# Patient Record
Sex: FEMALE | Race: WHITE | URBAN - METROPOLITAN AREA
[De-identification: names, ages, dates, MRNs, and addresses within clinical notes are randomized per-mention and may not be internally consistent; named-entity substitution may affect disease eponyms.]

---

## 2023-06-26 ENCOUNTER — HOSPITAL ENCOUNTER (EMERGENCY)
Age: 38
Discharge: HOME | End: 2023-06-27
Attending: STUDENT IN AN ORGANIZED HEALTH CARE EDUCATION/TRAINING PROGRAM

## 2023-06-26 DIAGNOSIS — F10.121 ALCOHOL INTOXICATION DELIRIUM (HCC): Primary | ICD-10-CM

## 2023-06-26 DIAGNOSIS — F39 MOOD DISORDER (HCC): ICD-10-CM

## 2023-06-26 LAB
ALBUMIN SERPL-MCNC: 4.4 G/DL (ref 3.4–5)
ALBUMIN/GLOB SERPL: 1.7 {RATIO} (ref 1.1–2.2)
ALP SERPL-CCNC: 38 U/L (ref 40–129)
ALT SERPL-CCNC: 12 U/L (ref 10–40)
AMPHETAMINES UR QL SCN>1000 NG/ML: NORMAL
ANION GAP SERPL CALCULATED.3IONS-SCNC: 20 MMOL/L (ref 3–16)
APAP SERPL-MCNC: <5 UG/ML (ref 10–30)
AST SERPL-CCNC: 22 U/L (ref 15–37)
BARBITURATES UR QL SCN>200 NG/ML: NORMAL
BASOPHILS # BLD: 0.1 K/UL (ref 0–0.2)
BASOPHILS NFR BLD: 0.8 %
BENZODIAZ UR QL SCN>200 NG/ML: NORMAL
BILIRUB SERPL-MCNC: 0.3 MG/DL (ref 0–1)
BUN SERPL-MCNC: 7 MG/DL (ref 7–20)
CALCIUM SERPL-MCNC: 9.3 MG/DL (ref 8.3–10.6)
CANNABINOIDS UR QL SCN>50 NG/ML: NORMAL
CHLORIDE SERPL-SCNC: 104 MMOL/L (ref 99–110)
CO2 SERPL-SCNC: 19 MMOL/L (ref 21–32)
COCAINE UR QL SCN: NORMAL
CREAT SERPL-MCNC: 0.7 MG/DL (ref 0.6–1.1)
DEPRECATED RDW RBC AUTO: 14.4 % (ref 12.4–15.4)
DRUG SCREEN COMMENT UR-IMP: NORMAL
EOSINOPHIL # BLD: 0.1 K/UL (ref 0–0.6)
EOSINOPHIL NFR BLD: 1.3 %
ETHANOLAMINE SERPL-MCNC: 270 MG/DL (ref 0–0.08)
FENTANYL SCREEN, URINE: NORMAL
GFR SERPLBLD CREATININE-BSD FMLA CKD-EPI: >60 ML/MIN/{1.73_M2}
GLUCOSE SERPL-MCNC: 95 MG/DL (ref 70–99)
HCT VFR BLD AUTO: 42.9 % (ref 36–48)
HGB BLD-MCNC: 14.4 G/DL (ref 12–16)
LYMPHOCYTES # BLD: 2.4 K/UL (ref 1–5.1)
LYMPHOCYTES NFR BLD: 25.5 %
MCH RBC QN AUTO: 31.9 PG (ref 26–34)
MCHC RBC AUTO-ENTMCNC: 33.5 G/DL (ref 31–36)
MCV RBC AUTO: 95.1 FL (ref 80–100)
METHADONE UR QL SCN>300 NG/ML: NORMAL
MONOCYTES # BLD: 0.7 K/UL (ref 0–1.3)
MONOCYTES NFR BLD: 7.5 %
NEUTROPHILS # BLD: 6 K/UL (ref 1.7–7.7)
NEUTROPHILS NFR BLD: 64.9 %
OPIATES UR QL SCN>300 NG/ML: NORMAL
OXYCODONE UR QL SCN: NORMAL
PCP UR QL SCN>25 NG/ML: NORMAL
PH UR STRIP: 5 [PH]
PLATELET # BLD AUTO: 330 K/UL (ref 135–450)
PMV BLD AUTO: 7.6 FL (ref 5–10.5)
POTASSIUM SERPL-SCNC: 3.6 MMOL/L (ref 3.5–5.1)
PROT SERPL-MCNC: 7 G/DL (ref 6.4–8.2)
RBC # BLD AUTO: 4.52 M/UL (ref 4–5.2)
SALICYLATES SERPL-MCNC: <0.3 MG/DL (ref 15–30)
SODIUM SERPL-SCNC: 143 MMOL/L (ref 136–145)
WBC # BLD AUTO: 9.3 K/UL (ref 4–11)

## 2023-06-26 PROCEDURE — 80143 DRUG ASSAY ACETAMINOPHEN: CPT

## 2023-06-26 PROCEDURE — 82077 ASSAY SPEC XCP UR&BREATH IA: CPT

## 2023-06-26 PROCEDURE — 80053 COMPREHEN METABOLIC PANEL: CPT

## 2023-06-26 PROCEDURE — 80307 DRUG TEST PRSMV CHEM ANLYZR: CPT

## 2023-06-26 PROCEDURE — 80179 DRUG ASSAY SALICYLATE: CPT

## 2023-06-26 PROCEDURE — 6360000002 HC RX W HCPCS: Performed by: STUDENT IN AN ORGANIZED HEALTH CARE EDUCATION/TRAINING PROGRAM

## 2023-06-26 PROCEDURE — 85025 COMPLETE CBC W/AUTO DIFF WBC: CPT

## 2023-06-26 PROCEDURE — 2580000003 HC RX 258: Performed by: STUDENT IN AN ORGANIZED HEALTH CARE EDUCATION/TRAINING PROGRAM

## 2023-06-26 PROCEDURE — 99285 EMERGENCY DEPT VISIT HI MDM: CPT

## 2023-06-26 PROCEDURE — 96372 THER/PROPH/DIAG INJ SC/IM: CPT

## 2023-06-26 PROCEDURE — 36415 COLL VENOUS BLD VENIPUNCTURE: CPT

## 2023-06-26 RX ORDER — 0.9 % SODIUM CHLORIDE 0.9 %
1000 INTRAVENOUS SOLUTION INTRAVENOUS ONCE
Status: COMPLETED | OUTPATIENT
Start: 2023-06-26 | End: 2023-06-27

## 2023-06-26 RX ORDER — DROPERIDOL 2.5 MG/ML
5 INJECTION, SOLUTION INTRAMUSCULAR; INTRAVENOUS ONCE
Status: COMPLETED | OUTPATIENT
Start: 2023-06-26 | End: 2023-06-26

## 2023-06-26 RX ORDER — 0.9 % SODIUM CHLORIDE 0.9 %
1000 INTRAVENOUS SOLUTION INTRAVENOUS ONCE
Status: COMPLETED | OUTPATIENT
Start: 2023-06-26 | End: 2023-06-26

## 2023-06-26 RX ORDER — LORAZEPAM 2 MG/ML
2 INJECTION INTRAMUSCULAR ONCE
Status: COMPLETED | OUTPATIENT
Start: 2023-06-26 | End: 2023-06-26

## 2023-06-26 RX ORDER — DIPHENHYDRAMINE HYDROCHLORIDE 50 MG/ML
50 INJECTION INTRAMUSCULAR; INTRAVENOUS ONCE
Status: COMPLETED | OUTPATIENT
Start: 2023-06-26 | End: 2023-06-26

## 2023-06-26 RX ADMIN — SODIUM CHLORIDE 1000 ML: 9 INJECTION, SOLUTION INTRAVENOUS at 23:40

## 2023-06-26 RX ADMIN — DROPERIDOL 5 MG: 2.5 INJECTION, SOLUTION INTRAMUSCULAR; INTRAVENOUS at 17:40

## 2023-06-26 RX ADMIN — DIPHENHYDRAMINE HYDROCHLORIDE 50 MG: 50 INJECTION, SOLUTION INTRAMUSCULAR; INTRAVENOUS at 17:40

## 2023-06-26 RX ADMIN — LORAZEPAM 2 MG: 2 INJECTION INTRAMUSCULAR; INTRAVENOUS at 17:39

## 2023-06-26 RX ADMIN — SODIUM CHLORIDE 1000 ML: 9 INJECTION, SOLUTION INTRAVENOUS at 19:17

## 2023-06-27 VITALS
SYSTOLIC BLOOD PRESSURE: 105 MMHG | HEART RATE: 73 BPM | OXYGEN SATURATION: 98 % | TEMPERATURE: 97.5 F | DIASTOLIC BLOOD PRESSURE: 63 MMHG | BODY MASS INDEX: 19.75 KG/M2 | RESPIRATION RATE: 18 BRPM | WEIGHT: 108 LBS

## 2023-06-27 LAB — ETHANOLAMINE SERPL-MCNC: 52 MG/DL (ref 0–0.08)

## 2023-06-27 PROCEDURE — 82077 ASSAY SPEC XCP UR&BREATH IA: CPT

## 2023-06-27 PROCEDURE — 36415 COLL VENOUS BLD VENIPUNCTURE: CPT

## 2023-12-04 ENCOUNTER — HOSPITAL ENCOUNTER (OUTPATIENT)
Age: 38
Setting detail: OBSERVATION
LOS: 1 days | Discharge: HOME | End: 2023-12-05
Attending: EMERGENCY MEDICINE | Admitting: PSYCHIATRY & NEUROLOGY
Payer: COMMERCIAL

## 2023-12-04 DIAGNOSIS — R45.1 AGITATION: Primary | ICD-10-CM

## 2023-12-04 PROBLEM — F33.9 MAJOR DEPRESSION, RECURRENT (HCC): Status: ACTIVE | Noted: 2023-12-04

## 2023-12-04 LAB
ALBUMIN SERPL-MCNC: 4.2 G/DL (ref 3.4–5)
ALBUMIN/GLOB SERPL: 2.2 {RATIO} (ref 1.1–2.2)
ALP SERPL-CCNC: 31 U/L (ref 40–129)
ALT SERPL-CCNC: 12 U/L (ref 10–40)
AMPHETAMINES UR QL SCN>1000 NG/ML: ABNORMAL
ANION GAP SERPL CALCULATED.3IONS-SCNC: 15 MMOL/L (ref 3–16)
APAP SERPL-MCNC: <5 UG/ML (ref 10–30)
AST SERPL-CCNC: 17 U/L (ref 15–37)
BARBITURATES UR QL SCN>200 NG/ML: ABNORMAL
BASOPHILS # BLD: 0.1 K/UL (ref 0–0.2)
BASOPHILS NFR BLD: 1.1 %
BENZODIAZ UR QL SCN>200 NG/ML: POSITIVE
BILIRUB SERPL-MCNC: <0.2 MG/DL (ref 0–1)
BILIRUB UR QL STRIP.AUTO: NEGATIVE
BUN SERPL-MCNC: 12 MG/DL (ref 7–20)
CALCIUM SERPL-MCNC: 8.2 MG/DL (ref 8.3–10.6)
CANNABINOIDS UR QL SCN>50 NG/ML: ABNORMAL
CHLORIDE SERPL-SCNC: 113 MMOL/L (ref 99–110)
CLARITY UR: CLEAR
CO2 SERPL-SCNC: 19 MMOL/L (ref 21–32)
COCAINE UR QL SCN: ABNORMAL
COLOR UR: YELLOW
CREAT SERPL-MCNC: <0.5 MG/DL (ref 0.6–1.1)
DEPRECATED RDW RBC AUTO: 13.3 % (ref 12.4–15.4)
DRUG SCREEN COMMENT UR-IMP: ABNORMAL
EOSINOPHIL # BLD: 0.4 K/UL (ref 0–0.6)
EOSINOPHIL NFR BLD: 7.5 %
ETHANOLAMINE SERPL-MCNC: 249 MG/DL (ref 0–0.08)
ETHANOLAMINE SERPL-MCNC: 80 MG/DL (ref 0–0.08)
FENTANYL SCREEN, URINE: ABNORMAL
GFR SERPLBLD CREATININE-BSD FMLA CKD-EPI: >60 ML/MIN/{1.73_M2}
GLUCOSE SERPL-MCNC: 86 MG/DL (ref 70–99)
GLUCOSE UR STRIP.AUTO-MCNC: NEGATIVE MG/DL
HCG UR QL: NEGATIVE
HCT VFR BLD AUTO: 38.3 % (ref 36–48)
HGB BLD-MCNC: 13.1 G/DL (ref 12–16)
HGB UR QL STRIP.AUTO: NEGATIVE
KETONES UR STRIP.AUTO-MCNC: NEGATIVE MG/DL
LEUKOCYTE ESTERASE UR QL STRIP.AUTO: NEGATIVE
LYMPHOCYTES # BLD: 1.9 K/UL (ref 1–5.1)
LYMPHOCYTES NFR BLD: 37.2 %
MAGNESIUM SERPL-MCNC: 2.4 MG/DL (ref 1.8–2.4)
MCH RBC QN AUTO: 31.6 PG (ref 26–34)
MCHC RBC AUTO-ENTMCNC: 34.4 G/DL (ref 31–36)
MCV RBC AUTO: 92.1 FL (ref 80–100)
METHADONE UR QL SCN>300 NG/ML: ABNORMAL
MONOCYTES # BLD: 0.4 K/UL (ref 0–1.3)
MONOCYTES NFR BLD: 7.1 %
NEUTROPHILS # BLD: 2.4 K/UL (ref 1.7–7.7)
NEUTROPHILS NFR BLD: 47.1 %
NITRITE UR QL STRIP.AUTO: NEGATIVE
OPIATES UR QL SCN>300 NG/ML: ABNORMAL
OXYCODONE UR QL SCN: ABNORMAL
PCP UR QL SCN>25 NG/ML: ABNORMAL
PH UR STRIP.AUTO: 6 [PH] (ref 5–8)
PH UR STRIP: 6 [PH]
PLATELET # BLD AUTO: 296 K/UL (ref 135–450)
PMV BLD AUTO: 7.4 FL (ref 5–10.5)
POTASSIUM SERPL-SCNC: 3.4 MMOL/L (ref 3.5–5.1)
PROT SERPL-MCNC: 6.1 G/DL (ref 6.4–8.2)
PROT UR STRIP.AUTO-MCNC: NEGATIVE MG/DL
RBC # BLD AUTO: 4.15 M/UL (ref 4–5.2)
SALICYLATES SERPL-MCNC: <0.3 MG/DL (ref 15–30)
SODIUM SERPL-SCNC: 147 MMOL/L (ref 136–145)
SP GR UR STRIP.AUTO: <=1.005 (ref 1–1.03)
TSH SERPL DL<=0.005 MIU/L-ACNC: 1.89 UIU/ML (ref 0.27–4.2)
UA COMPLETE W REFLEX CULTURE PNL UR: NORMAL
UA DIPSTICK W REFLEX MICRO PNL UR: NORMAL
URN SPEC COLLECT METH UR: NORMAL
UROBILINOGEN UR STRIP-ACNC: 0.2 E.U./DL
WBC # BLD AUTO: 5.1 K/UL (ref 4–11)

## 2023-12-04 PROCEDURE — 83036 HEMOGLOBIN GLYCOSYLATED A1C: CPT

## 2023-12-04 PROCEDURE — 80053 COMPREHEN METABOLIC PANEL: CPT

## 2023-12-04 PROCEDURE — 6360000002 HC RX W HCPCS

## 2023-12-04 PROCEDURE — 80061 LIPID PANEL: CPT

## 2023-12-04 PROCEDURE — 80307 DRUG TEST PRSMV CHEM ANLYZR: CPT

## 2023-12-04 PROCEDURE — 1240000000 HC EMOTIONAL WELLNESS R&B

## 2023-12-04 PROCEDURE — 80179 DRUG ASSAY SALICYLATE: CPT

## 2023-12-04 PROCEDURE — 36415 COLL VENOUS BLD VENIPUNCTURE: CPT

## 2023-12-04 PROCEDURE — 99285 EMERGENCY DEPT VISIT HI MDM: CPT

## 2023-12-04 PROCEDURE — 85025 COMPLETE CBC W/AUTO DIFF WBC: CPT

## 2023-12-04 PROCEDURE — 96372 THER/PROPH/DIAG INJ SC/IM: CPT

## 2023-12-04 PROCEDURE — 82077 ASSAY SPEC XCP UR&BREATH IA: CPT

## 2023-12-04 PROCEDURE — 80143 DRUG ASSAY ACETAMINOPHEN: CPT

## 2023-12-04 PROCEDURE — 84443 ASSAY THYROID STIM HORMONE: CPT

## 2023-12-04 PROCEDURE — 83735 ASSAY OF MAGNESIUM: CPT

## 2023-12-04 PROCEDURE — 81003 URINALYSIS AUTO W/O SCOPE: CPT

## 2023-12-04 PROCEDURE — 84703 CHORIONIC GONADOTROPIN ASSAY: CPT

## 2023-12-04 RX ORDER — LORAZEPAM 2 MG/ML
2 INJECTION INTRAMUSCULAR ONCE
Status: COMPLETED | OUTPATIENT
Start: 2023-12-04 | End: 2023-12-04

## 2023-12-04 RX ORDER — DIPHENHYDRAMINE HYDROCHLORIDE 50 MG/ML
50 INJECTION INTRAMUSCULAR; INTRAVENOUS ONCE
Status: COMPLETED | OUTPATIENT
Start: 2023-12-04 | End: 2023-12-04

## 2023-12-04 RX ORDER — NICOTINE 21 MG/24HR
1 PATCH, TRANSDERMAL 24 HOURS TRANSDERMAL DAILY
Status: DISCONTINUED | OUTPATIENT
Start: 2023-12-04 | End: 2023-12-05

## 2023-12-04 RX ORDER — DIPHENHYDRAMINE HYDROCHLORIDE 50 MG/ML
INJECTION INTRAMUSCULAR; INTRAVENOUS
Status: COMPLETED
Start: 2023-12-04 | End: 2023-12-04

## 2023-12-04 RX ORDER — POLYETHYLENE GLYCOL 3350 17 G
2 POWDER IN PACKET (EA) ORAL
Status: DISCONTINUED | OUTPATIENT
Start: 2023-12-04 | End: 2023-12-05 | Stop reason: HOSPADM

## 2023-12-04 RX ORDER — IBUPROFEN 400 MG/1
400 TABLET ORAL EVERY 6 HOURS PRN
Status: DISCONTINUED | OUTPATIENT
Start: 2023-12-04 | End: 2023-12-05 | Stop reason: HOSPADM

## 2023-12-04 RX ORDER — LORAZEPAM 2 MG/ML
INJECTION INTRAMUSCULAR
Status: COMPLETED
Start: 2023-12-04 | End: 2023-12-04

## 2023-12-04 RX ORDER — MAGNESIUM HYDROXIDE/ALUMINUM HYDROXICE/SIMETHICONE 120; 1200; 1200 MG/30ML; MG/30ML; MG/30ML
30 SUSPENSION ORAL EVERY 6 HOURS PRN
Status: DISCONTINUED | OUTPATIENT
Start: 2023-12-04 | End: 2023-12-05 | Stop reason: HOSPADM

## 2023-12-04 RX ORDER — ACETAMINOPHEN 325 MG/1
650 TABLET ORAL EVERY 4 HOURS PRN
Status: DISCONTINUED | OUTPATIENT
Start: 2023-12-04 | End: 2023-12-05 | Stop reason: HOSPADM

## 2023-12-04 RX ORDER — OLANZAPINE 10 MG/1
10 TABLET ORAL EVERY 4 HOURS PRN
Status: DISCONTINUED | OUTPATIENT
Start: 2023-12-04 | End: 2023-12-05 | Stop reason: HOSPADM

## 2023-12-04 RX ORDER — HALOPERIDOL 5 MG/ML
INJECTION INTRAMUSCULAR
Status: COMPLETED
Start: 2023-12-04 | End: 2023-12-04

## 2023-12-04 RX ORDER — BENZTROPINE MESYLATE 1 MG/ML
2 INJECTION INTRAMUSCULAR; INTRAVENOUS 2 TIMES DAILY PRN
Status: DISCONTINUED | OUTPATIENT
Start: 2023-12-04 | End: 2023-12-05 | Stop reason: HOSPADM

## 2023-12-04 RX ORDER — HALOPERIDOL 5 MG/ML
5 INJECTION INTRAMUSCULAR ONCE
Status: COMPLETED | OUTPATIENT
Start: 2023-12-04 | End: 2023-12-04

## 2023-12-04 RX ORDER — DOCUSATE SODIUM 100 MG/1
100 CAPSULE, LIQUID FILLED ORAL 2 TIMES DAILY PRN
Status: DISCONTINUED | OUTPATIENT
Start: 2023-12-04 | End: 2023-12-05 | Stop reason: HOSPADM

## 2023-12-04 RX ADMIN — DIPHENHYDRAMINE HYDROCHLORIDE 50 MG: 50 INJECTION INTRAMUSCULAR; INTRAVENOUS at 01:02

## 2023-12-04 RX ADMIN — LORAZEPAM 2 MG: 2 INJECTION INTRAMUSCULAR at 01:02

## 2023-12-04 RX ADMIN — LORAZEPAM 2 MG: 2 INJECTION INTRAMUSCULAR; INTRAVENOUS at 01:02

## 2023-12-04 RX ADMIN — HALOPERIDOL 5 MG: 5 INJECTION INTRAMUSCULAR at 01:02

## 2023-12-04 RX ADMIN — HALOPERIDOL LACTATE 5 MG: 5 INJECTION, SOLUTION INTRAMUSCULAR at 01:02

## 2023-12-04 ASSESSMENT — LIFESTYLE VARIABLES
HOW MANY STANDARD DRINKS CONTAINING ALCOHOL DO YOU HAVE ON A TYPICAL DAY: PATIENT DOES NOT DRINK
HOW OFTEN DO YOU HAVE A DRINK CONTAINING ALCOHOL: NEVER

## 2023-12-04 ASSESSMENT — PATIENT HEALTH QUESTIONNAIRE - PHQ9
2. FEELING DOWN, DEPRESSED OR HOPELESS: 1
2. FEELING DOWN, DEPRESSED OR HOPELESS: 1
SUM OF ALL RESPONSES TO PHQ9 QUESTIONS 1 & 2: 2
SUM OF ALL RESPONSES TO PHQ QUESTIONS 1-9: 2
SUM OF ALL RESPONSES TO PHQ9 QUESTIONS 1 & 2: 2
SUM OF ALL RESPONSES TO PHQ QUESTIONS 1-9: 2
1. LITTLE INTEREST OR PLEASURE IN DOING THINGS: 1
SUM OF ALL RESPONSES TO PHQ QUESTIONS 1-9: 2
SUM OF ALL RESPONSES TO PHQ QUESTIONS 1-9: 2
1. LITTLE INTEREST OR PLEASURE IN DOING THINGS: 1
SUM OF ALL RESPONSES TO PHQ QUESTIONS 1-9: 2

## 2023-12-04 ASSESSMENT — PAIN - FUNCTIONAL ASSESSMENT: PAIN_FUNCTIONAL_ASSESSMENT: NONE - DENIES PAIN

## 2023-12-04 ASSESSMENT — SLEEP AND FATIGUE QUESTIONNAIRES
DO YOU USE A SLEEP AID: NO
AVERAGE NUMBER OF SLEEP HOURS: 5
DO YOU HAVE DIFFICULTY SLEEPING: NO
DO YOU HAVE DIFFICULTY SLEEPING: NO
DO YOU USE A SLEEP AID: NO
AVERAGE NUMBER OF SLEEP HOURS: 5

## 2023-12-04 NOTE — FLOWSHEET NOTE
C-SSRS Suicide screening. Patient stated that she has thought about going to sleep and not waking up several times in the last month. She has had thoughts of killing herself in the last month without having a plan. She verbalized that she felt like has some intention to kill herself with these thoughts in the last month. Electronically signed by Santosh Jeter RN on 12/4/2023 at 11:33 AM         12/04/23 1012   C-SSRS Suicide Screening   1) Within the past month, have you wished you were dead or wished you could go to sleep and not wake up? Yes   2) Have you actually had any thoughts of killing yourself? Yes   3) Have you been thinking about how you might kill yourself? No   4) Have you had these thoughts and had some intention of acting on them? Yes   5) Have you started to work out or worked out the details of how to kill yourself? Do you intend to carry out this plan? No   6) Have you ever done anything, started to do anything, or prepared to do anything to end your life?  No   Risk of Suicide High Risk

## 2023-12-04 NOTE — ED NOTES
Level of Care Disposition: admit     Patient was seen by ED provider and Nursing/SW staff. The case presented to psychiatric provider on-call . Based on the ED evaluation and information presented to the provider by this writer it is the recommendation of the on call psychiatric provider that inpatient hospitalization is the least restrictive environment for the patient at this time. The patient will be admitted to the inpatient unit.            Insurance Pre certification Authorization: Pt has active insurance listed at this time          Keith KINCAID
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
## 2023-12-04 NOTE — BH NOTE
CSSR-S Assessment completed with patient who then scored HIGH RISK. Provider Dr. Mary Pandey was notified of HIGH RISK score, via phone at [phone]. Were suicide precautions ordered: NO    If not ordered, justification as follows: Patient denies suicidal ideation, feels safe on the unit.      Completed by: Antonia Gannon RN
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
## 2023-12-04 NOTE — ED PROVIDER NOTES
All other labs were within normal range or not returned as of this dictation. EMERGENCY DEPARTMENT COURSE and DIFFERENTIAL DIAGNOSIS/MDM:   Vitals:    Vitals:    12/04/23 0229 12/04/23 0615   BP: (!) 94/57 97/62   Pulse: 70 73   Resp: 16 15   SpO2: 98% 98%       Differential diagnosis includes but is not limited to psychosis, drug intoxication, metabolic abnormality    Medical Decision Making  Amount and/or Complexity of Data Reviewed  Labs: ordered. Risk  Prescription drug management. Patient requiring chemical and physical restraints for safety of her and staff. She was given medications for sedation and will require reassessment. Will sign the patient out to the oncoming physician pending reassessment after medications wear off        REASSESSMENT     ED Course as of 12/04/23 0704   [facility] Dec 04, 2023   0106 Patient requiring physical and chemical restraints for safety of patient and staff [AM]      ED Course User Index  [AM] Aries Toro MD         CRITICAL CARE TIME   None    CONSULTS:  None    PROCEDURES:  Unless otherwise noted below, none     Procedures        FINAL IMPRESSION      1. Agitation          DISPOSITION/PLAN   DISPOSITION Ed Observation 12/04/2023 07:04:16 AM      PATIENT REFERRED TO:  No follow-up provider specified.     DISCHARGE MEDICATIONS:  New Prescriptions    No medications on file     Controlled Substances Monitoring:          No data to display                (Please note that portions of this note were completed with a voice recognition program.  Efforts were made to edit the dictations but occasionally words are mis-transcribed.)    Aries Toro MD (electronically signed)  Attending Emergency Physician            Aries Toro MD  12/04/23 2063

## 2023-12-04 NOTE — PLAN OF CARE
Problem: Self Harm/Suicidality  Goal: Will have no self-injury during hospital stay  Description: INTERVENTIONS:  1. Ensure constant observer at bedside with Q15M safety checks  2. Maintain a safe environment  3. Secure patient belongings  4. Ensure family/visitors adhere to safety recommendations  5. Ensure safety tray has been added to patient's diet order  6.   Every shift and PRN: Re-assess suicidal risk via Frequent Screener    Outcome: Completed

## 2023-12-04 NOTE — ED NOTES
Patient came uncooperative, shouting and combative. 4 point violent restraint applied .      Tonja Tobias RN  12/04/23 6123

## 2023-12-05 VITALS
HEIGHT: 62 IN | RESPIRATION RATE: 16 BRPM | TEMPERATURE: 99.4 F | HEART RATE: 73 BPM | SYSTOLIC BLOOD PRESSURE: 112 MMHG | DIASTOLIC BLOOD PRESSURE: 74 MMHG | BODY MASS INDEX: 20.24 KG/M2 | OXYGEN SATURATION: 97 % | WEIGHT: 110 LBS

## 2023-12-05 PROBLEM — F32.A DEPRESSION, UNSPECIFIED: Status: ACTIVE | Noted: 2023-12-05

## 2023-12-05 PROBLEM — F12.90 CANNABIS USE DISORDER: Status: ACTIVE | Noted: 2023-12-05

## 2023-12-05 PROBLEM — F10.920 ACUTE ALCOHOLIC INTOXICATION WITHOUT COMPLICATION (HCC): Status: RESOLVED | Noted: 2023-12-05 | Resolved: 2023-12-05

## 2023-12-05 PROBLEM — Z78.9 ALCOHOL USE: Status: ACTIVE | Noted: 2023-12-05

## 2023-12-05 PROBLEM — E87.6 HYPOKALEMIA: Status: RESOLVED | Noted: 2023-12-05 | Resolved: 2023-12-05

## 2023-12-05 PROBLEM — E87.6 HYPOKALEMIA: Status: ACTIVE | Noted: 2023-12-05

## 2023-12-05 PROBLEM — F10.920 ACUTE ALCOHOLIC INTOXICATION WITHOUT COMPLICATION (HCC): Status: ACTIVE | Noted: 2023-12-05

## 2023-12-05 PROBLEM — R45.851 SUICIDAL IDEATION: Status: ACTIVE | Noted: 2023-12-05

## 2023-12-05 LAB
ANION GAP SERPL CALCULATED.3IONS-SCNC: 10 MMOL/L (ref 3–16)
BUN SERPL-MCNC: 18 MG/DL (ref 7–20)
CALCIUM SERPL-MCNC: 8.9 MG/DL (ref 8.3–10.6)
CHLORIDE SERPL-SCNC: 103 MMOL/L (ref 99–110)
CHOLEST SERPL-MCNC: 162 MG/DL (ref 0–199)
CO2 SERPL-SCNC: 24 MMOL/L (ref 21–32)
CREAT SERPL-MCNC: 0.7 MG/DL (ref 0.6–1.1)
GFR SERPLBLD CREATININE-BSD FMLA CKD-EPI: >60 ML/MIN/{1.73_M2}
GLUCOSE SERPL-MCNC: 111 MG/DL (ref 70–99)
HDLC SERPL-MCNC: 67 MG/DL (ref 40–60)
LDLC SERPL CALC-MCNC: 82 MG/DL
POTASSIUM SERPL-SCNC: 4 MMOL/L (ref 3.5–5.1)
SODIUM SERPL-SCNC: 137 MMOL/L (ref 136–145)
TRIGL SERPL-MCNC: 66 MG/DL (ref 0–150)
VLDLC SERPL CALC-MCNC: 13 MG/DL

## 2023-12-05 PROCEDURE — 80048 BASIC METABOLIC PNL TOTAL CA: CPT

## 2023-12-05 PROCEDURE — 99223 1ST HOSP IP/OBS HIGH 75: CPT | Performed by: PSYCHIATRY & NEUROLOGY

## 2023-12-05 PROCEDURE — 5130000000 HC BRIDGE APPOINTMENT

## 2023-12-05 PROCEDURE — 99221 1ST HOSP IP/OBS SF/LOW 40: CPT

## 2023-12-05 PROCEDURE — G0378 HOSPITAL OBSERVATION PER HR: HCPCS

## 2023-12-05 PROCEDURE — 36415 COLL VENOUS BLD VENIPUNCTURE: CPT

## 2023-12-05 NOTE — PLAN OF CARE
[facility]  Treatment Team Note  Review Date & Time: 12/05/23  8439    Patient was not in treatment team      Status EXAM:   Mental Status and Behavioral Exam  Normal: No  Level of Assistance: Independent/Self  Facial Expression: Sad, Worried  Affect: Congruent  Level of Consciousness: Alert  Frequency of Checks: 4 times per hour, close  Mood:Normal: No  Mood: Sad, Anxious  Motor Activity:Normal: Yes  Motor Activity: Decreased  Eye Contact: Good  Observed Behavior: Cooperative, Guarded, Friendly  Sexual Misconduct History: Current - no  Preception: [place] to person, [place] to time, [place] to place, [place] to situation  Attention:Normal: Yes  Attention: Unable to concentrate  Thought Processes: Circumstantial  Thought Content:Normal: Yes  Thought Content: Poverty of content  Depression Symptoms: Change in energy level, Crying  Anxiety Symptoms: Generalized  Anahi Symptoms: No problems reported or observed. Hallucinations: None  Delusions: No  Memory:Normal: Yes  Memory: Other (comment) (anna)  Insight and Judgment: No  Insight and Judgment: Poor judgment, Poor insight      Suicide Risk CSSR-S:  1) Within the past month, have you wished you were dead or wished you could go to sleep and not wake up? :  (ANNA declined to answer)  2) Have you actually had any thoughts of killing yourself? :  (ANNA declined to answer)  3) Have you been thinking about how you might kill yourself? :  (ANNA declined to answer)  5) Have you started to work out or worked out the details of how to kill yourself?  Do you intend to carry out this plan? :  (Louie Zamora declined to answer)  6) Have you ever done anything, started to do anything, or prepared to do anything to end your life?:  (Louie Zamora declined to answer)      PLAN/TREATMENT RECOMMENDATIONS UPDATE:   Patient will take medication as prescribed, eat 75% of meals, attend groups, participate in milieu activities, participate in treatment team and care planning for discharge and follow

## 2023-12-05 NOTE — PLAN OF CARE
Nursing shift report 1930 to present- Patient has been resting in bed with no signs of distress. She did not attend group and did not come out of her room in the milieu. When awakened she is quite irritable and labile. She denied SI, HI and AVH. She stated that she is not going to kill herself, does not need to be here, needs to get back to work so she can take care of her kids and this is all just a waste of time. She did allow vital signs. She refused EKG stating that she is fine and does not need a \" fucking EKG\". She refused any physical assessment but did deny pain and did not appear to be in any physical distress. She was verbally reassured and did calm some. She was offered food and fluids. She declined food but did ask for ice water which was given to her. Oral fluids encouraged. She was less irritable when this writer returned with her ice water and did say thank you. She refused to sign any admission packet paperwork stating that she is here against her will and not signing anything. She did not voice any plans to attempt to AWOl and stayed in her room. She was verbally reassured and instructed to ask this writer if she needs anything during the shift. Safety checks continue. Problem: Safety - Violent/Self-destructive Restraint  Goal: Remains free of injury from restraints (Restraint for Violent/Self-Destructive Behavior)  Description: INTERVENTIONS:  1. Determine that de-escalation and other, less restrictive measures have been tried or would not be effective before applying the restraint  2. Identify and document the criteria for restraint  3. Evaluate the patient's condition at the time of restraint application  4. Inform patient/family regarding the reason for restraint/seclusion  5. Q2H: Monitor comfort, nutrition and hydration needs  6. Q15M: Perform safety checks including skin, circulation, sensory, respiratory and psychological status  7. Ensure continuous observation  8.  Identify and

## 2023-12-05 NOTE — PROGRESS NOTES
4 Eyes Skin Assessment     The patient is being assessed for  Admission    I agree that 2 RN's have performed a thorough Head to Toe Skin Assessment on the patient. ALL assessment sites listed below have been assessed. Areas assessed for pressure by both nurses:   [x]   Head, Face, and Ears   [x]   Shoulders, Back, and Chest  [x]   Arms, Elbows, and Hands   [x]   Coccyx, Sacrum, and Ischum  [x]   Legs, Feet, and Heels                                Skin Assessed Under all Medical Devices by both nurses:  None in place               All Mepilex Borders were peeled back and area peeked at by both nurses:  No: none in place  Please list where Mepilex Borders are located:  none                 Does the Patient have Skin Breakdown related to pressure?   No              Steve Prevention initiated:  NA   Wound Care Orders initiated:  NA      [facility] nurse consulted for Pressure Injury (Stage 3,4, Unstageable, DTI, NWPT, Complex wounds)and New or Established Ostomies:  NA        Nurse 1 eSignature: Electronically signed by Vinnie Diallo RN on 12/5/23 at 8:24 AM EST    **SHARE this note so that the co-signing nurse is able to place an eSignature**    Nurse 2 eSignature: Electronically signed by Sol Hodgkins, RN on 12/5/23 at 1:04 PM EST
[facility]  Discharge Note    Pt discharged with followings belongings:   Dental Appliances: None  Vision - Corrective Lenses: None  Hearing Aid: None  Jewelry: Bracelet, Necklace, Ring, Other (Comment) (placed in safe)  Body Piercings Removed: N/A  Clothing: Pants, Shirt, Undergarments  Other Valuables: Keys (placed in safe)   Valuables returned to patient. Patient educated on aftercare instructions: yes  . Patient verbalize understanding of AVS:  yes. Status EXAM upon discharge:  Mental Status and Behavioral Exam  Normal: No  Level of Assistance: Independent/Self  Facial Expression: Sad, Worried  Affect: Congruent  Level of Consciousness: Alert  Frequency of Checks: 4 times per hour, close  Mood:Normal: No  Mood: Sad, Anxious  Motor Activity:Normal: Yes  Motor Activity: Decreased  Eye Contact: Good  Observed Behavior: Cooperative, Guarded, Friendly  Sexual Misconduct History: Current - no  Preception: [place] to person, [place] to time, [place] to place, [place] to situation  Attention:Normal: Yes  Attention: Unable to concentrate  Thought Processes: Circumstantial  Thought Content:Normal: Yes  Thought Content: Poverty of content  Depression Symptoms: Change in energy level, Crying  Anxiety Symptoms: Generalized  Anahi Symptoms: No problems reported or observed.   Hallucinations: None  Delusions: No  Memory:Normal: Yes  Memory: Other (comment) (anna)  Insight and Judgment: No  Insight and Judgment: Poor judgment, Poor insight    Tobacco Screening:  Practical Counseling, on admission, kimberly X, if applicable and completed (first 3 are required if patient doesn't refuse):            ( ) Recognizing danger situations (included triggers and roadblocks)                    ( ) Coping skills (new ways to manage stress,relaxation techniques, changing routine, distraction)                                                           ( ) Basic information about quitting (benefits of quitting, techniques in how to
Attempted to wake up Ms Kamran Mckenzie to complete her CSSRS assessment. However during questioning she was falling to sleep during questioning and refused to answer. ETOH at 0124 was 249. Will re-assess when the patient is more cooperative.  Electronically signed by Laurie Graf RN on 12/4/2023 at 9:31 AM
Bridge Appointment completed: Reviewed Discharge Instructions with patient. Patient verbalizes understanding and agreement with the discharge plan using the teachback method.          Vaccinations (kimberly X if applicable and completed):  ( ) Patient states already received influenza vaccine elsewhere  ( ) Patient received influenza vaccine during this hospitalization  ( ) Patient refused influenza vaccine at this time  (x ) Not offered
Home Medication Reconciliation Status          [x] COMPLETE       Medication history has been reviewed and obtained from the following source(s):       [x] patient/family verbal report             [] patient/family provided written list       [] external pharmacy   [] external facility list         []  Provider notified that home medication reconciliation is complete          [] IN PROGRESS       Medication reconciliation marked in progress at this time due to:       [] patient/family poor historian      [] waiting arrival of family to clarify       [] waiting for accurate list        [] external pharmacy needs called      * Follow up is needed. [] UNABLE TO ASSESS       Medication reconciliation is incomplete and unable to assess at this time due to:       [] critical patient condition   [] patient is unresponsive        [] no family available                       [] unknown pharmacy       [] anonymous patient          * Follow up is needed.       [] Pharmacy consult placed for medication reconciliation assistance   Additional comments:  Patient denies any current medication
Patient declined to sign paperwork at this time.
Patient withdrawn to room. Patient declined skin assessment and physical assessment at this time. Patient answered limited questions. Patient denies SI/HI, AVH and pain at this time. Patient belongings are in locker and valuables in safe.
Spoke with Patient's Son Julianna Madrid. Julianna Madrid stated that he felt his mother was safe to come home and he was ok with her discharging home.
This writer assumed care of patient at [address] [place] [address]. Patient presently resting in bed with eyes closed , respirations even and easy with no signs of distress noted. This writer will attempt to complete parts of admission patient had refused on previous shift when she awakens. Safety checks continue.
Coping skills (new ways to manage stress,relaxation techniques, changing routine, distraction)                                                           ( ) Basic information about quitting (benefits of quitting, techniques in how to quit, available resources  ( ) Referral for counseling faxed to [phone]                                                                                                                   ( x) Patient refused counseling  ( ) Patient has not smoked in the last 30 days    Metabolic Screening:    No results found for: \"LABA1C\"    No results found for: \"CHOL\"  No results found for: \"TRIG\"  No results found for: \"HDL\"  No components found for: \"LDLCAL\"  No results found for: \"LABVLDL\"      There is no height or weight on file to calculate BMI. BP Readings from Last 2 Encounters:   12/04/23 (!) 111/55   06/27/23 105/63           Pt admitted with followings belongings:  Dental Appliances: None  Vision - Corrective Lenses: None  Hearing Aid: None  Jewelry: Bracelet, Necklace, Ring, Other (Comment) (placed in safe)  Body Piercings Removed: N/A  Clothing: Pants, Shirt, Undergarments  Other Valuables: Keys (placed in safe)  The following personal items were collected during admission. Items secured in locker/safe. Items will be returned to patient at discharge.      Jazmin Mcleod RN

## 2023-12-05 NOTE — PLAN OF CARE
Problem: Depression/Self Harm  Goal: Effect of psychiatric condition will be minimized and patient will be protected from self harm  Description: INTERVENTIONS:  1. Assess impact of patient's symptoms on level of functioning, self care needs and offer support as indicated  2. Assess patient/family knowledge of depression, impact on illness and need for teaching  3. Provide emotional support, presence and reassurance  4. Assess for possible suicidal thoughts or ideation. If patient expresses suicidal thoughts or statements do not leave alone, initiate Suicide Precautions, move to a room close to the nursing station and obtain sitter  5. Initiate consults as appropriate with Mental Health Professional, Spiritual Care, Psychosocial CNS, and consider a recommendation to the LIP for a Psychiatric Consultation  Outcome: Progressing    Patient denies SI/HI, AVH and pain. Patient tearful during assessment and interview. Patient out for breakfast. Patient withdrawn to room and voices she wants to go home so she doesn't lose her job.

## 2023-12-05 NOTE — DISCHARGE INSTRUCTIONS
Advanced Directives:  Patient does not have a surrogate decision maker appointed   Name (if yes): N/A Phone Number: N/A  Patient does not have a psychiatric and/ or medical advanced directive or power of . Patient was offered psychiatric and/ or medical advanced directive or power of  information/completion but declined to complete   Why not? N/A    Discharge Planning is Complete. Discharge Date: 12/5/23   Reason for Hospitalization: The patient is a 34 y.o. female with pmhx as below who presented to [facility] ED for suicidal ideation. Patient brought in by Police due to sending a message to her friend that she wanted to die and sent a photo of a cut to her wrist with blood running down. Upon arrival patient was found asleep with an empty bottle of vodka. In ambulance, patient yelled \"I want to die\" and \"don't make me go back to my life. \"  Discharge Diagnosis: ASA (generalized anxiety disorder)   Discharging to: Home    Your instructions: Your physician here was Lenise Goodpasture, MD. If you have any questions please call the unit at [phone] for the adult unit or [phone] for [facility]. Please note that we have a patient family advisory [place] that meets the second Wednesday of January and the second Wednesday of July at [address] [place] [address] in [place] at [facility]. Department leadership would love for you to attend to give feedback on what we are doing well and areas in which we can improve our patient care. Please come if you are able and feel free to reach out to [address] [place] [address] at [phone] with any questions. The crisis number for [facility] is [phone] (SAVE). This crisis line is available 24 hours a day, seven days a week. Please follow up with your PCP regarding any pending labs. You are connected to [facility] and have standing Friday appointments.  You do not take any medication at this time, but [facility] will

## 2023-12-06 ENCOUNTER — FOLLOWUP TELEPHONE ENCOUNTER (OUTPATIENT)
Dept: PSYCHIATRY | Age: 38
End: 2023-12-06

## 2023-12-06 LAB
EST. AVERAGE GLUCOSE BLD GHB EST-MCNC: 99.7 MG/DL
HBA1C MFR BLD: 5.1 %

## 2023-12-06 NOTE — DISCHARGE SUMMARY
Department of Psychiatry    Discharge Summary      Deedee Lackey  [id number]    Admission date:   12/4/2023    Discharge:   Date: 12/5/2023   Location: home    Inpatient Provider: Jaren Ziegler MD  Unit: [facility]    Diagnosis on Admission:  ASA (generalized anxiety disorder)    Diagnosis on Discharge:  ASA (generalized anxiety disorder)    Active Hospital Problems    Diagnosis Date Noted    Alcohol use [Z78.9] 12/05/2023    Cannabis use disorder [F12.90] 12/05/2023    ASA (generalized anxiety disorder) [F41.1] 04/23/2011     Reason for Admission and Hospital Course:   IDENTIFICATION:  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 SOURCE OF INFORMATION:  The patient. Focused record review. Son   at home. CHIEF COMPLAINT:  \"I'm not suicidal, I just want to go home. \"     HISTORY OF PRESENT ILLNESS:  The patient reports she has been in  her usual state of health. Sunday evening she drank a large amount of vodka which she says is unusual for her. The next thing she knows, joceline was bringing her to the ED. She does not remember much about what happened, but  insists she was not suicidal.  She was not suicidal before she  began drinking and has not thought about suicide since admission. She disputes what the hold says about what happened when police arrived. She is adamant the superficial and small scratches on  her wrist are due to her cat, not self-harm. She says she has not struggled depression though has  has had anxiety off and on since her father was killed years ago  in the [employer] mass shooting. She does not endorse or voice symptoms of  depression, josephine, or of psychosis. She has demonstrated safe behavior  since admission. She hopes to be discharged today to return home to her  25year-old son.